# Patient Record
Sex: MALE | Employment: OTHER | URBAN - METROPOLITAN AREA
[De-identification: names, ages, dates, MRNs, and addresses within clinical notes are randomized per-mention and may not be internally consistent; named-entity substitution may affect disease eponyms.]

---

## 2020-06-19 ENCOUNTER — HOSPITAL ENCOUNTER (EMERGENCY)
Age: 75
Discharge: HOME | End: 2020-06-19
Attending: EMERGENCY MEDICINE
Payer: MEDICARE

## 2020-06-19 VITALS
SYSTOLIC BLOOD PRESSURE: 124 MMHG | WEIGHT: 220 LBS | HEIGHT: 74 IN | DIASTOLIC BLOOD PRESSURE: 63 MMHG | BODY MASS INDEX: 28.23 KG/M2 | OXYGEN SATURATION: 94 % | RESPIRATION RATE: 18 BRPM | HEART RATE: 77 BPM | TEMPERATURE: 97.8 F

## 2020-06-19 PROCEDURE — 99282 EMERGENCY DEPT VISIT SF MDM: CPT

## 2020-06-19 RX ORDER — FUROSEMIDE 80 MG
80 TABLET ORAL 2 TIMES DAILY
COMMUNITY

## 2020-06-19 RX ORDER — WARFARIN SODIUM 5 MG/1
5 TABLET ORAL
COMMUNITY

## 2020-06-19 RX ORDER — WARFARIN SODIUM 2.5 MG/1
2.5 TABLET ORAL
COMMUNITY

## 2020-06-19 NOTE — ED TRIAGE NOTES
Patient states he was shaving this am at 0500 when he hit the tip of his nose. Patient had been bleeding since. The last couple hours bleeding has slowed down. Patient on coumadin. Went to coumadin clinic and last inr 2.5.

## 2020-06-22 ASSESSMENT — ENCOUNTER SYMPTOMS
COUGH: 0
SHORTNESS OF BREATH: 0

## 2020-06-22 NOTE — ED PROVIDER NOTES
[address] [facility] ED  EMERGENCY DEPARTMENT ENCOUNTER      Pt Name: Bonny Barnett  MRN: [id number]  [place] 1945  Date of evaluation: 6/19/2020  Provider: Hermes Green MD    CHIEF COMPLAINT     Chief Complaint   Patient presents with    Laceration     patient cut his nose while shaving at 0500, has beed bleeding since. HISTORY OF PRESENT ILLNESS   (Location/Symptom, Timing/Onset, Context/Setting,Quality, Duration, Modifying Factors, Severity)  Note limiting factors. Bonny Barnett is a 67 y.o. male who presents to the emergency department stating while shaving this morning he accidentally nicked left nostril and has been oozing blood from the since then. He is on Coumadin. No other complaints are reported. The history is provided by the patient. Nursing Notes werereviewed. REVIEW OF SYSTEMS    (2-9 systems for level 4, 10 or more for level 5)     Review of Systems   Constitutional: Negative for fever. Respiratory: Negative for cough and shortness of breath. All other systems reviewed and are negative. Except as noted above the remainder of the review of systems was reviewed and negative. PAST MEDICAL HISTORY     Past Medical History:   Diagnosis Date    Atrial fibrillation ([address]     CHF (congestive heart failure) (HCC)     Chronic kidney disease     Diabetes mellitus ([facility]     Hypertension          SURGICALHISTORY       Past Surgical History:   Procedure Laterality Date    CAROTID STENT PLACEMENT      EYE SURGERY      NECK SURGERY      PACEMAKER PLACEMENT           CURRENT MEDICATIONS       Discharge Medication List as of 6/19/2020  2:57 PM      CONTINUE these medications which have NOT CHANGED    Details   !! warfarin (COUMADIN) 2.5 MG tablet Take 2.5 mg by mouth Every other day. Historical Med      !! warfarin (COUMADIN) 5 MG tablet Take 5 mg by mouth Monday and FridayHistorical Med      furosemide (LASIX) 80 MG tablet Take 80 mg by mouth 2 times laceration over over the margin of the left ala nasi. There is clotted blood over this and there is no active bleeding right now. Eyes:      Extraocular Movements: Extraocular movements intact. Skin:     General: Skin is warm and dry. Neurological:      Mental Status: He is alert. DIAGNOSTIC RESULTS     EKG: All EKG's are interpreted by the Emergency Department Physician who either signs orCo-signs this chart in the absence of a cardiologist.    RADIOLOGY:   Non-plain film images such as CT, Ultrasound and MRI are read by the radiologist. Plain radiographic images are visualized and preliminarily interpreted by the emergency physician with the below findings:    Interpretation per the Radiologist below, ifavailable at the time of this note:    No orders to display         ED BEDSIDE ULTRASOUND:   Performed by ED Physician - none    LABS:  Labs Reviewed - No data to display    All other labs were within normal range ornot returned as of this dictation. EMERGENCY DEPARTMENT COURSE and DIFFERENTIAL DIAGNOSIS/MDM:   Vitals:    Vitals:    06/19/20 1430   BP: 124/63   Pulse: 77   Resp: 18   Temp: 97.8 °F (36.6 °C)   TempSrc: Oral   SpO2: 94%   Weight: 99.8 kg (220 lb)   Height: 6' 2\" (1.88 m)            Patient has a very superficial laceration over the left nostril. This is covered with Dermabond. This should help seal the laceration still adequate hemostasis can be obtained. MDM    CONSULTS:  None    PROCEDURES:  Unlessotherwise noted below, none     Procedures    FINAL IMPRESSION      1. Laceration of nose, initial encounter          DISPOSITION/PLAN   DISPOSITION Decision To Discharge 06/19/2020 02:43:29 PM      PATIENT REFERRED TO:  [facility]  [address] [address]  [phone]    As needed, If symptoms worsen      DISCHARGE MEDICATIONS:         Problem List:  There is no problem list on file for this patient.           Summation      Patient Course: